# Patient Record
Sex: FEMALE | Race: WHITE | NOT HISPANIC OR LATINO | URBAN - METROPOLITAN AREA
[De-identification: names, ages, dates, MRNs, and addresses within clinical notes are randomized per-mention and may not be internally consistent; named-entity substitution may affect disease eponyms.]

---

## 2018-04-25 RX ORDER — SERTRALINE HYDROCHLORIDE 100 MG/1
TABLET, FILM COATED ORAL
Qty: 30 TABLET | Refills: 4 | Status: SHIPPED | OUTPATIENT
Start: 2018-04-25 | End: 2020-05-04

## 2018-05-24 ENCOUNTER — OFFICE VISIT (OUTPATIENT)
Dept: FAMILY MEDICINE | Facility: CLINIC | Age: 33
End: 2018-05-24
Payer: COMMERCIAL

## 2018-05-24 VITALS
HEART RATE: 68 BPM | TEMPERATURE: 99 F | SYSTOLIC BLOOD PRESSURE: 114 MMHG | BODY MASS INDEX: 28.12 KG/M2 | DIASTOLIC BLOOD PRESSURE: 76 MMHG | OXYGEN SATURATION: 97 % | HEIGHT: 62 IN | WEIGHT: 152.8 LBS

## 2018-05-24 DIAGNOSIS — H61.23 BILATERAL IMPACTED CERUMEN: Primary | ICD-10-CM

## 2018-05-24 PROCEDURE — 69210 REMOVE IMPACTED EAR WAX UNI: CPT | Performed by: FAMILY MEDICINE

## 2018-05-24 PROCEDURE — 99213 OFFICE O/P EST LOW 20 MIN: CPT | Mod: 25 | Performed by: FAMILY MEDICINE

## 2018-05-24 RX ORDER — MONTELUKAST SODIUM 10 MG/1
10 TABLET ORAL EVERY EVENING
COMMUNITY
Start: 2017-12-22 | End: 2018-05-25 | Stop reason: SDUPTHER

## 2018-05-24 RX ORDER — LANOLIN ALCOHOL/MO/W.PET/CERES
CREAM (GRAM) TOPICAL
COMMUNITY
Start: 2015-11-23

## 2018-05-24 RX ORDER — BUDESONIDE AND FORMOTEROL FUMARATE DIHYDRATE 160; 4.5 UG/1; UG/1
AEROSOL RESPIRATORY (INHALATION)
COMMUNITY
Start: 2018-01-14 | End: 2018-09-05 | Stop reason: SDUPTHER

## 2018-05-24 RX ORDER — FLUTICASONE PROPIONATE 50 MCG
1 SPRAY, SUSPENSION (ML) NASAL DAILY
COMMUNITY

## 2018-05-24 ASSESSMENT — ENCOUNTER SYMPTOMS
RHINORRHEA: 0
FEVER: 0
CHILLS: 0

## 2018-05-24 NOTE — PATIENT INSTRUCTIONS
Limit Qtip use  Hydrogen Peroxide drops for a few days prior to future appointments  Let me know if any ear pain  Schedule physical

## 2018-05-24 NOTE — PROGRESS NOTES
[facility]  [address] [place] [address]  [phone]       Reason for visit:   Chief Complaint   Patient presents with   • Cerumen Impaction     bilateral   • other     wants to discuss about getting blood work      HPI   Analilia Gunderson is a 33 y.o. female who presents with ear issue   - Ear Issue  Recurrent issue  Had them cleaned out recently - few months ago  Feels clogged again  Both ears, R > L  Not painful, discharge  Denies fevers, chills, congestion     History reviewed. No pertinent past medical history.  History reviewed. No pertinent surgical history.  Social History     Social History   • Marital status: Single     Spouse name: N/A   • Number of children: N/A   • Years of education: N/A     Occupational History   • Not on file.     Social History Main Topics   • Smoking status: Former Smoker   • Smokeless tobacco: Never Used   • Alcohol use Yes      Comment: social   • Drug use: No   • Sexual activity: Not on file     Other Topics Concern   • Not on file     Social History Narrative    Do you wear your seatbelt? Yes    Do you have smoke detector in your home? Yes    Do you have a carbon monoxide detector in your home? Yes    Current Occupation?     Current Marital Status? Single         History reviewed. No pertinent family history.  No known allergies  Current Outpatient Prescriptions   Medication Sig Dispense Refill   • budesonide-formoterol (SYMBICORT) 160-4.5 mcg/actuation inhaler INHALE 2 PUFF BY INHALATION ROUTE 2 TIMES EVERY DAY IN THE MORNING AND EVENING     • cetirizine (ZyrTEC) 10 mg tablet 10 mg.     • cyanocobalamin (vitamin B-12) 1,000 mcg tablet Take by mouth.     • fluticasone (FLONASE) 50 mcg/actuation nasal spray Administer 1 spray into each nostril daily.     • montelukast (SINGULAIR) 10 mg tablet Take 10 mg by mouth every evening.     • sertraline (ZOLOFT) 100 mg tablet TAKE 1 TABLET BY MOUTH EVERY DAY 30 tablet 4      No current facility-administered medications for this visit.        Review of Systems   Constitutional: Negative for chills and fever.   HENT: Positive for ear pain and hearing loss. Negative for congestion, ear discharge and rhinorrhea.      Objective   Vitals:    05/24/18 1349   BP: 114/76   Pulse: 68   Temp: 37.2 °C (99 °F)   SpO2: 97%       Physical Exam   HENT:   Right Ear: External ear normal.   Left Ear: External ear normal.   B/l cerumen impaction       Ear cerumen removal  Date/Time: 5/24/2018 2:15 PM  Performed by: KIM CEDEÑO  Authorized by: KIM CEDEÑO     Consent:     Consent obtained:  Verbal    Consent given by:  Patient    Risks discussed:  Pain, TM perforation, incomplete removal and dizziness    Alternatives discussed:  Observation and referral  Procedure details:     Location:  L ear and R ear    Procedure type: irrigation      Procedure type comment:  With curette  Post-procedure details:     Inspection:  TM intact    Hearing quality:  Improved    Patient tolerance of procedure:  Tolerated well, no immediate complications        Lab Results   Component Value Date    WBC 13.46 (H) 10/08/2015    HGB 10.9 (L) 10/08/2015    HCT 31.8 (L) 10/08/2015     10/08/2015         Assessment   Problem List Items Addressed This Visit     None      Visit Diagnoses     Bilateral impacted cerumen    -  Primary    New Problem  Chronic  Happens 2x/year  Trouble hearing  No pain  Ears cleaned with improvement and no SEs              Kim Cedeño MD  5/24/2018

## 2018-05-25 RX ORDER — MONTELUKAST SODIUM 10 MG/1
TABLET ORAL
Qty: 30 TABLET | Refills: 4 | Status: SHIPPED | OUTPATIENT
Start: 2018-05-25 | End: 2018-10-22 | Stop reason: SDUPTHER

## 2018-06-07 ENCOUNTER — OFFICE VISIT (OUTPATIENT)
Dept: FAMILY MEDICINE | Facility: CLINIC | Age: 33
End: 2018-06-07
Payer: COMMERCIAL

## 2018-06-07 VITALS
BODY MASS INDEX: 26.87 KG/M2 | OXYGEN SATURATION: 98 % | TEMPERATURE: 98.2 F | WEIGHT: 146 LBS | HEIGHT: 62 IN | HEART RATE: 71 BPM | DIASTOLIC BLOOD PRESSURE: 78 MMHG | SYSTOLIC BLOOD PRESSURE: 114 MMHG

## 2018-06-07 DIAGNOSIS — Z00.00 PREVENTATIVE HEALTH CARE: Primary | ICD-10-CM

## 2018-06-07 DIAGNOSIS — F32.1 MODERATE MAJOR DEPRESSION (CMS/HCC): ICD-10-CM

## 2018-06-07 DIAGNOSIS — J45.40 MODERATE PERSISTENT ASTHMA WITHOUT COMPLICATION: ICD-10-CM

## 2018-06-07 PROCEDURE — 99395 PREV VISIT EST AGE 18-39: CPT | Performed by: FAMILY MEDICINE

## 2018-06-07 ASSESSMENT — ENCOUNTER SYMPTOMS
POLYPHAGIA: 0
BRUISES/BLEEDS EASILY: 0
WHEEZING: 0
POLYDIPSIA: 0
HEADACHES: 0
SHORTNESS OF BREATH: 0
FATIGUE: 1
DIARRHEA: 0
ARTHRALGIAS: 0
CONSTIPATION: 0
BACK PAIN: 0

## 2018-06-07 NOTE — PROGRESS NOTES
[place] [address]  [phone]       Reason for visit:   Chief Complaint   Patient presents with   • Annual Exam      HPI   Analilia Gunderson is a 33 y.o. female who presents for CPE. She has depression and asthma. She feels Zoloft is not effective and may be cotributing to weight gain. She also has family history of thyroid disease      History reviewed. No pertinent past medical history.  History reviewed. No pertinent surgical history.  Social History     Social History   • Marital status: Single     Spouse name: N/A   • Number of children: N/A   • Years of education: N/A     Occupational History   • Not on file.     Social History Main Topics   • Smoking status: Former Smoker   • Smokeless tobacco: Never Used   • Alcohol use Yes      Comment: social   • Drug use: No   • Sexual activity: Not on file     Other Topics Concern   • Not on file     Social History Narrative    Do you wear your seatbelt? Yes    Do you have smoke detector in your home? Yes    Do you have a carbon monoxide detector in your home? Yes    Current Occupation?     Current Marital Status? Single         No family history on file.  No known allergies  Current Outpatient Prescriptions   Medication Sig Dispense Refill   • budesonide-formoterol (SYMBICORT) 160-4.5 mcg/actuation inhaler INHALE 2 PUFF BY INHALATION ROUTE 2 TIMES EVERY DAY IN THE MORNING AND EVENING     • cetirizine (ZyrTEC) 10 mg tablet 10 mg.     • cyanocobalamin (vitamin B-12) 1,000 mcg tablet Take by mouth.     • fluticasone (FLONASE) 50 mcg/actuation nasal spray Administer 1 spray into each nostril daily.     • montelukast (SINGULAIR) 10 mg tablet TAKE 1 TABLET (10MG) BY ORAL ROUTE EVERY DAY IN THE EVENING 30 tablet 4   • sertraline (ZOLOFT) 100 mg tablet TAKE 1 TABLET BY MOUTH EVERY DAY 30 tablet 4     No current facility-administered medications for this visit.        Review of Systems  "  Constitutional: Positive for fatigue.   HENT: Negative for hearing loss.    Eyes: Negative for visual disturbance.   Respiratory: Negative for shortness of breath and wheezing.    Cardiovascular: Negative for chest pain and leg swelling.   Gastrointestinal: Negative for constipation and diarrhea.   Endocrine: Negative for polydipsia, polyphagia and polyuria.   Musculoskeletal: Negative for arthralgias and back pain.   Skin: Negative for rash.   Neurological: Negative for headaches.   Hematological: Does not bruise/bleed easily.     Objective   Vitals:    06/07/18 1205   BP: 114/78   BP Location: Left upper arm   Patient Position: Sitting   Pulse: 71   Temp: 36.8 °C (98.2 °F)   TempSrc: Oral   SpO2: 98%   Weight: 66.2 kg (146 lb)   Height: 1.575 m (5' 2\")     Body mass index is 26.7 kg/m².  Physical Exam   Constitutional: She appears well-nourished.   HENT:   Right Ear: Tympanic membrane normal.   Left Ear: Tympanic membrane normal.   Mouth/Throat: Oropharynx is clear and moist.   Eyes: Conjunctivae are normal.   Neck: No thyromegaly present.   Cardiovascular: Normal rate, regular rhythm, normal heart sounds and intact distal pulses.    Pulmonary/Chest: Effort normal and breath sounds normal.   Abdominal: Soft.   Musculoskeletal: Normal range of motion.   Neurological: She is alert.   Skin: Skin is warm and dry.   Psychiatric: She has a normal mood and affect. Her behavior is normal. Thought content normal.       Procedures    Lab Results   Component Value Date    WBC 13.46 (H) 10/08/2015    HGB 10.9 (L) 10/08/2015    HCT 31.8 (L) 10/08/2015     10/08/2015           Assessment   Problem List Items Addressed This Visit     Moderate persistent asthma without complication     Stable  Continue meds  Well controlled         Moderate major depression (CMS/HCC) (HCC)     To consider switch to Lexapro if labs are normal         Preventative health care - Primary     Labs  She will get old immunization record         " Relevant Orders    CBC and Differential    Comprehensive metabolic panel    Lipid panel    TSH 3rd Generation              Harry A. Frankel, MD  6/7/2018

## 2018-06-16 LAB
ALBUMIN SERPL-MCNC: 3.8 G/DL (ref 3.5–5.5)
ALBUMIN/GLOB SERPL: 1.4 {RATIO} (ref 1.2–2.2)
ALP SERPL-CCNC: 51 IU/L (ref 39–117)
ALT SERPL-CCNC: 14 IU/L (ref 0–32)
AST SERPL-CCNC: 19 IU/L (ref 0–40)
BASOPHILS # BLD AUTO: 0.1 X10E3/UL (ref 0–0.2)
BASOPHILS NFR BLD AUTO: 1 %
BILIRUB SERPL-MCNC: <0.2 MG/DL (ref 0–1.2)
BUN SERPL-MCNC: 11 MG/DL (ref 6–20)
BUN/CREAT SERPL: 16 (ref 9–23)
CALCIUM SERPL-MCNC: 9.2 MG/DL (ref 8.7–10.2)
CHLORIDE SERPL-SCNC: 103 MMOL/L (ref 96–106)
CHOLEST SERPL-MCNC: 164 MG/DL (ref 100–199)
CO2 SERPL-SCNC: 22 MMOL/L (ref 20–29)
CREAT SERPL-MCNC: 0.69 MG/DL (ref 0.57–1)
EOSINOPHIL # BLD AUTO: 0.2 X10E3/UL (ref 0–0.4)
EOSINOPHIL NFR BLD AUTO: 4 %
ERYTHROCYTE [DISTWIDTH] IN BLOOD BY AUTOMATED COUNT: 15.1 % (ref 12.3–15.4)
GFR SERPLBLD CREATININE-BSD FMLA CKD-EPI: 115 ML/MIN/1.73
GFR SERPLBLD CREATININE-BSD FMLA CKD-EPI: 132 ML/MIN/1.73
GLOBULIN SER CALC-MCNC: 2.8 G/DL (ref 1.5–4.5)
GLUCOSE SERPL-MCNC: 95 MG/DL (ref 65–99)
HCT VFR BLD AUTO: 34.7 % (ref 34–46.6)
HDLC SERPL-MCNC: 60 MG/DL
HGB BLD-MCNC: 11.1 G/DL (ref 11.1–15.9)
IMM GRANULOCYTES # BLD: 0 X10E3/UL (ref 0–0.1)
IMM GRANULOCYTES NFR BLD: 0 %
LDLC SERPL CALC-MCNC: 87 MG/DL (ref 0–99)
LYMPHOCYTES # BLD AUTO: 1.6 X10E3/UL (ref 0.7–3.1)
LYMPHOCYTES NFR BLD AUTO: 31 %
MCH RBC QN AUTO: 27 PG (ref 26.6–33)
MCHC RBC AUTO-ENTMCNC: 32 G/DL (ref 31.5–35.7)
MCV RBC AUTO: 84 FL (ref 79–97)
MONOCYTES # BLD AUTO: 0.5 X10E3/UL (ref 0.1–0.9)
MONOCYTES NFR BLD AUTO: 11 %
NEUTROPHILS # BLD AUTO: 2.7 X10E3/UL (ref 1.4–7)
NEUTROPHILS NFR BLD AUTO: 53 %
PLATELET # BLD AUTO: 300 X10E3/UL (ref 150–379)
POTASSIUM SERPL-SCNC: 4.4 MMOL/L (ref 3.5–5.2)
PROT SERPL-MCNC: 6.6 G/DL (ref 6–8.5)
RBC # BLD AUTO: 4.11 X10E6/UL (ref 3.77–5.28)
SODIUM SERPL-SCNC: 140 MMOL/L (ref 134–144)
TRIGL SERPL-MCNC: 83 MG/DL (ref 0–149)
TSH SERPL DL<=0.005 MIU/L-ACNC: 1.39 UIU/ML (ref 0.45–4.5)
VLDLC SERPL CALC-MCNC: 17 MG/DL (ref 5–40)
WBC # BLD AUTO: 5 X10E3/UL (ref 3.4–10.8)

## 2018-06-18 ENCOUNTER — TELEPHONE (OUTPATIENT)
Dept: FAMILY MEDICINE | Facility: CLINIC | Age: 33
End: 2018-06-18

## 2018-06-18 NOTE — TELEPHONE ENCOUNTER
I called pt- LMOM.    ----- Message from Harry A Frankel, MD sent at 6/17/2018 12:34 PM EDT -----  Please call and notify that her labs all came back normal. No change in meds

## 2018-09-05 RX ORDER — BUDESONIDE AND FORMOTEROL FUMARATE DIHYDRATE 160; 4.5 UG/1; UG/1
AEROSOL RESPIRATORY (INHALATION)
Qty: 10.2 INHALER | Refills: 3 | Status: SHIPPED | OUTPATIENT
Start: 2018-09-05 | End: 2019-01-24 | Stop reason: SDUPTHER

## 2018-10-22 RX ORDER — MONTELUKAST SODIUM 10 MG/1
TABLET ORAL
Qty: 30 TABLET | Refills: 4 | Status: SHIPPED | OUTPATIENT
Start: 2018-10-22 | End: 2019-01-24 | Stop reason: SDUPTHER

## 2018-11-13 ENCOUNTER — DOCUMENTATION (OUTPATIENT)
Dept: FAMILY MEDICINE | Facility: CLINIC | Age: 33
End: 2018-11-13

## 2019-01-24 RX ORDER — MONTELUKAST SODIUM 10 MG/1
10 TABLET ORAL EVERY EVENING
Qty: 90 TABLET | Refills: 3 | Status: SHIPPED | OUTPATIENT
Start: 2019-01-24 | End: 2019-07-01 | Stop reason: SDUPTHER

## 2019-01-24 RX ORDER — BUDESONIDE AND FORMOTEROL FUMARATE DIHYDRATE 160; 4.5 UG/1; UG/1
2 AEROSOL RESPIRATORY (INHALATION) 2 TIMES DAILY
Qty: 10.2 INHALER | Refills: 3 | Status: SHIPPED | OUTPATIENT
Start: 2019-01-24 | End: 2019-01-28 | Stop reason: SDUPTHER

## 2019-01-28 RX ORDER — BUDESONIDE AND FORMOTEROL FUMARATE DIHYDRATE 160; 4.5 UG/1; UG/1
2 AEROSOL RESPIRATORY (INHALATION) 2 TIMES DAILY
Qty: 3 INHALER | Refills: 3 | Status: SHIPPED | OUTPATIENT
Start: 2019-01-28 | End: 2019-01-29 | Stop reason: SDUPTHER

## 2019-01-29 RX ORDER — BUDESONIDE AND FORMOTEROL FUMARATE DIHYDRATE 160; 4.5 UG/1; UG/1
2 AEROSOL RESPIRATORY (INHALATION) 2 TIMES DAILY
Qty: 3 INHALER | Refills: 3 | Status: SHIPPED | OUTPATIENT
Start: 2019-01-29 | End: 2020-02-14

## 2019-05-28 ENCOUNTER — OFFICE VISIT (OUTPATIENT)
Dept: FAMILY MEDICINE | Facility: CLINIC | Age: 34
End: 2019-05-28
Payer: COMMERCIAL

## 2019-05-28 VITALS
BODY MASS INDEX: 26.31 KG/M2 | DIASTOLIC BLOOD PRESSURE: 74 MMHG | WEIGHT: 143 LBS | HEART RATE: 66 BPM | OXYGEN SATURATION: 98 % | TEMPERATURE: 98.4 F | SYSTOLIC BLOOD PRESSURE: 114 MMHG | HEIGHT: 62 IN

## 2019-05-28 DIAGNOSIS — H61.23 BILATERAL HEARING LOSS DUE TO CERUMEN IMPACTION: Primary | ICD-10-CM

## 2019-05-28 PROBLEM — R53.81 MALAISE AND FATIGUE: Status: ACTIVE | Noted: 2018-04-04

## 2019-05-28 PROBLEM — R06.83 SNORING: Status: ACTIVE | Noted: 2018-04-04

## 2019-05-28 PROBLEM — R53.83 MALAISE AND FATIGUE: Status: ACTIVE | Noted: 2018-04-04

## 2019-05-28 PROCEDURE — 69210 REMOVE IMPACTED EAR WAX UNI: CPT | Performed by: FAMILY MEDICINE

## 2019-05-28 PROCEDURE — 99213 OFFICE O/P EST LOW 20 MIN: CPT | Mod: 25 | Performed by: FAMILY MEDICINE

## 2019-05-28 RX ORDER — CITALOPRAM 20 MG/1
20 TABLET, FILM COATED ORAL DAILY
COMMUNITY
End: 2020-05-04 | Stop reason: SDUPTHER

## 2019-05-28 ASSESSMENT — ENCOUNTER SYMPTOMS
COUGH: 0
SORE THROAT: 0
RHINORRHEA: 0
DIARRHEA: 0
FEVER: 0
SHORTNESS OF BREATH: 0
CHILLS: 0
NAUSEA: 0

## 2019-05-28 NOTE — PATIENT INSTRUCTIONS
Symptoms should continue to improve the rest of the day  Can try Debrox drops or Hydrogen Peroxide  Happy to clear the ears out again when/if symptoms return

## 2019-05-28 NOTE — PROGRESS NOTES
[place] [address]  [phone]       Reason for visit:   Chief Complaint   Patient presents with   • Earache / Otalgia      HPI   Analilia Phillips is a 34 y.o. female who presents with ear pain   - Ear Pain  Hx of ear issues  Needs to have them cleared out a few times a year  X 2 weeks  R > L  Feels full, clogged  Decreased hearing  Feels sore  Denies discharge  Denies fevers, chills  Has not put anything in her ears     History reviewed. No pertinent past medical history.  History reviewed. No pertinent surgical history.  Social History     Social History   • Marital status: Single     Spouse name: N/A   • Number of children: N/A   • Years of education: N/A     Occupational History   • Not on file.     Social History Main Topics   • Smoking status: Former Smoker   • Smokeless tobacco: Never Used   • Alcohol use Yes      Comment: social   • Drug use: No   • Sexual activity: Not on file     Other Topics Concern   • Not on file     Social History Narrative    Do you wear your seatbelt? Yes    Do you have smoke detector in your home? Yes    Do you have a carbon monoxide detector in your home? Yes    Current Occupation?     Current Marital Status? Single         History reviewed. No pertinent family history.  Penicillins  Current Outpatient Prescriptions   Medication Sig Dispense Refill   • budesonide-formoterol (SYMBICORT) 160-4.5 mcg/actuation inhaler Inhale 2 puffs 2 (two) times a day. Rinse mouth with water after use to reduce aftertaste and incidence of candidiasis. Do not swallow. 3 Inhaler 3   • cetirizine (ZyrTEC) 10 mg tablet 10 mg.     • citalopram (celeXA) 20 mg tablet Take 20 mg by mouth daily.     • cyanocobalamin (vitamin B-12) 1,000 mcg tablet Take by mouth.     • fluticasone (FLONASE) 50 mcg/actuation nasal spray Administer 1 spray into each nostril daily.     • montelukast (SINGULAIR) 10 mg tablet Take 1  "tablet (10 mg total) by mouth every evening. 90 tablet 3   • sertraline (ZOLOFT) 100 mg tablet TAKE 1 TABLET BY MOUTH EVERY DAY (Patient not taking: Reported on 5/28/2019) 30 tablet 4     No current facility-administered medications for this visit.        Review of Systems   Constitutional: Negative for chills and fever.   HENT: Positive for ear pain, hearing loss and postnasal drip. Negative for congestion, ear discharge, rhinorrhea and sore throat.    Respiratory: Negative for cough and shortness of breath.    Cardiovascular: Negative for chest pain.   Gastrointestinal: Negative for diarrhea and nausea.     Objective   Vitals:    05/28/19 1330   BP: 114/74   BP Location: Left upper arm   Patient Position: Sitting   Pulse: 66   Temp: 36.9 °C (98.4 °F)   TempSrc: Oral   SpO2: 98%   Weight: 64.9 kg (143 lb)   Height: 1.575 m (5' 2\")       Physical Exam   HENT:   Right Ear: There is tenderness. A foreign body is present. Tympanic membrane is not perforated, not erythematous, not retracted and not bulging.   Left Ear: There is tenderness. A foreign body is present. Tympanic membrane is not perforated, not erythematous, not retracted and not bulging.       Ear cerumen removal  Date/Time: 5/28/2019 1:47 PM  Performed by: KIM CEDEÑO  Authorized by: KIM CEDEÑO     Consent:     Consent obtained:  Verbal    Consent given by:  Patient    Risks discussed:  Pain, TM perforation, incomplete removal and dizziness    Alternatives discussed:  Observation and referral  Procedure details:     Location:  L ear and R ear    Procedure type: irrigation      Procedure type comment:  With curette  Post-procedure details:     Inspection:  TM intact    Hearing quality:  Improved    Patient tolerance of procedure:  Tolerated well, no immediate complications        Lab Results   Component Value Date    WBC 5.0 06/15/2018    HGB 11.1 06/15/2018    HCT 34.7 06/15/2018     06/15/2018    CHOL 164 06/15/2018    TRIG 83 06/15/2018 "    HDL 60 06/15/2018    ALT 14 06/15/2018    AST 19 06/15/2018     06/15/2018    K 4.4 06/15/2018     06/15/2018    CREATININE 0.69 06/15/2018    BUN 11 06/15/2018    CO2 22 06/15/2018    TSH 1.390 06/15/2018         Assessment   Problem List Items Addressed This Visit     None      Visit Diagnoses     Bilateral hearing loss due to cerumen impaction    -  Primary    New Problem  x 2 weeks  R > L  Trouble hearing  Ache  No discharge  Denies F/C  B/L cerumen impaction  Removed with lavage and currete  TM visualized and WNL      Debrox or Hydrogen Peroxide as needed        Srinivasa Neumann MD  5/28/2019

## 2019-07-01 RX ORDER — MONTELUKAST SODIUM 10 MG/1
TABLET ORAL
Qty: 30 TABLET | Refills: 1 | Status: SHIPPED | OUTPATIENT
Start: 2019-07-01 | End: 2020-03-13

## 2019-07-01 NOTE — TELEPHONE ENCOUNTER
Medicine Refill Request    Last Office Visit: 5/28/2019  Next Office Visit: Visit date not found        Current Outpatient Prescriptions:   •  budesonide-formoterol (SYMBICORT) 160-4.5 mcg/actuation inhaler, Inhale 2 puffs 2 (two) times a day. Rinse mouth with water after use to reduce aftertaste and incidence of candidiasis. Do not swallow., Disp: 3 Inhaler, Rfl: 3  •  cetirizine (ZyrTEC) 10 mg tablet, 10 mg., Disp: , Rfl:   •  citalopram (celeXA) 20 mg tablet, Take 20 mg by mouth daily., Disp: , Rfl:   •  cyanocobalamin (vitamin B-12) 1,000 mcg tablet, Take by mouth., Disp: , Rfl:   •  fluticasone (FLONASE) 50 mcg/actuation nasal spray, Administer 1 spray into each nostril daily., Disp: , Rfl:   •  montelukast (SINGULAIR) 10 mg tablet, Take 1 tablet (10 mg total) by mouth every evening., Disp: 90 tablet, Rfl: 3  •  sertraline (ZOLOFT) 100 mg tablet, TAKE 1 TABLET BY MOUTH EVERY DAY (Patient not taking: Reported on 5/28/2019), Disp: 30 tablet, Rfl: 4      BP Readings from Last 3 Encounters:   05/28/19 114/74   06/07/18 114/78   05/24/18 114/76       Recent Lab results:  Lab Results   Component Value Date    CHOL 164 06/15/2018   ,   Lab Results   Component Value Date    HDL 60 06/15/2018   ,   Lab Results   Component Value Date    LDLCALC 87 06/15/2018   ,   Lab Results   Component Value Date    TRIG 83 06/15/2018        Lab Results   Component Value Date    GLUCOSE 95 06/15/2018   , No results found for: HGBA1C      Lab Results   Component Value Date    CREATININE 0.69 06/15/2018       Lab Results   Component Value Date    TSH 1.390 06/15/2018

## 2019-09-03 ENCOUNTER — TELEPHONE (OUTPATIENT)
Dept: PRIMARY CARE | Facility: CLINIC | Age: 34
End: 2019-09-03

## 2019-09-03 NOTE — PROGRESS NOTES
Call over weekend- 2 days of sore throat and fever w sore glands and no other symptoms- concern for strep. Allergic to PCN but ok with Cephalosporins so called Ceftin 500 BID to her pharmacy.

## 2020-02-14 RX ORDER — BUDESONIDE AND FORMOTEROL FUMARATE DIHYDRATE 160; 4.5 UG/1; UG/1
AEROSOL RESPIRATORY (INHALATION)
Qty: 30.6 G | Refills: 4 | Status: SHIPPED | OUTPATIENT
Start: 2020-02-14 | End: 2020-09-04

## 2020-03-13 RX ORDER — MONTELUKAST SODIUM 10 MG/1
TABLET ORAL
Qty: 90 TABLET | Refills: 3 | Status: SHIPPED | OUTPATIENT
Start: 2020-03-13 | End: 2021-03-08

## 2020-05-04 ENCOUNTER — TELEMEDICINE (OUTPATIENT)
Dept: FAMILY MEDICINE | Facility: CLINIC | Age: 35
End: 2020-05-04
Payer: COMMERCIAL

## 2020-05-04 DIAGNOSIS — J45.40 MODERATE PERSISTENT ASTHMA WITHOUT COMPLICATION: Primary | ICD-10-CM

## 2020-05-04 DIAGNOSIS — F32.1 MODERATE MAJOR DEPRESSION (CMS/HCC): ICD-10-CM

## 2020-05-04 DIAGNOSIS — J30.1 SEASONAL ALLERGIC RHINITIS DUE TO POLLEN: ICD-10-CM

## 2020-05-04 PROCEDURE — 99214 OFFICE O/P EST MOD 30 MIN: CPT | Mod: 95 | Performed by: FAMILY MEDICINE

## 2020-05-04 RX ORDER — ALBUTEROL SULFATE 5 MG/ML
2.5 SOLUTION RESPIRATORY (INHALATION) EVERY 6 HOURS PRN
COMMUNITY
End: 2020-05-04

## 2020-05-04 RX ORDER — CITALOPRAM 20 MG/1
20 TABLET, FILM COATED ORAL DAILY
Qty: 30 TABLET | Refills: 5 | Status: SHIPPED | OUTPATIENT
Start: 2020-05-04 | End: 2020-09-08 | Stop reason: SDUPTHER

## 2020-05-04 RX ORDER — ALBUTEROL SULFATE 0.83 MG/ML
2.5 SOLUTION RESPIRATORY (INHALATION) EVERY 6 HOURS PRN
Qty: 1 PACKAGE | Refills: 1 | Status: SHIPPED | OUTPATIENT
Start: 2020-05-04 | End: 2020-10-28

## 2020-05-04 RX ORDER — PREDNISONE 20 MG/1
40 TABLET ORAL DAILY
Qty: 10 TABLET | Refills: 0 | Status: SHIPPED | OUTPATIENT
Start: 2020-05-04 | End: 2020-07-27

## 2020-05-04 ASSESSMENT — ENCOUNTER SYMPTOMS
SHORTNESS OF BREATH: 1
FATIGUE: 0
WHEEZING: 1
SLEEP DISTURBANCE: 0
NERVOUS/ANXIOUS: 0

## 2020-05-04 NOTE — PROGRESS NOTES
Verification of Patient Location:  The patient affirms they are currently located in the following state: [place]    Request for Consent:   Audio Only Encounter   You and I are about to have a telemedicine check-in or visit. This is allowed because you have requested it. This telemedicine visit will be billed to your health insurance or you, if you are self-insured. You understand you will be responsible for any copayments or coinsurances that apply to your telemedicine visit. Before starting our telemedicine visit, I am required to get your consent for this virtual check-in or visit by telemedicine. Do you consent?    Patient Response to Request for Consent:  Yes      Visit Documentation:  Subjective     Patient ID: Analilia Gunderson is a 35 y.o. female.  1985      34 yo with asthma, depression and allergic rhinitis. Her asthma has flared and she has not been controlled on her medications. This is a seasonal problems. She has depression and has been using Celexa under her mother's name. Her symptoms are controlled on meds. Her asthma and allergies are not well controlled. She has not been getting flu shots. She needs to schedule gynecology.      The following have been reviewed and updated as appropriate in this visit:  Tobacco  Allergies  Meds  Problems  Med Hx  Surg Hx  Fam Hx  Soc Hx        Review of Systems   Constitutional: Negative for fatigue.   Respiratory: Positive for shortness of breath and wheezing.    Cardiovascular: Negative for chest pain.   Psychiatric/Behavioral: Negative for sleep disturbance. The patient is not nervous/anxious.          Assessment/Plan   Diagnoses and all orders for this visit:    Moderate persistent asthma without complication (Primary)  Assessment & Plan:  Not controlled  Add prednisone  Meds renewed    Orders:  -     Lipid panel; Future    Moderate major depression (CMS/[facility])  Assessment & Plan:  Stable  Continue meds  Labs    Orders:  -     CBC and  differential; Future  -     Comprehensive metabolic panel; Future  -     Lipid panel; Future  -     TSH; Future    Seasonal allergic rhinitis due to pollen  Assessment & Plan:  OTC meds      Other orders  -     citalopram (celeXA) 20 mg tablet; Take 1 tablet (20 mg total) by mouth daily.  -     albuterol sulfate 90 mcg/actuation inhaler; Inhale 2 puffs every 6 (six) hours as needed for wheezing.  -     albuterol 2.5 mg /3 mL (0.083 %) nebulizer solution; Take 3 mL (2.5 mg total) by nebulization every 6 (six) hours as needed for wheezing.  -     predniSONE (DELTASONE) 20 mg tablet; Take 2 tablets (40 mg total) by mouth daily for 5 days.      Time Spent in Medical Discussion During This Encounter:      24 minutes

## 2020-07-18 LAB
ALBUMIN SERPL-MCNC: 4.2 G/DL (ref 3.8–4.8)
ALBUMIN/GLOB SERPL: 1.6 {RATIO} (ref 1.2–2.2)
ALP SERPL-CCNC: 43 IU/L (ref 39–117)
ALT SERPL-CCNC: 10 IU/L (ref 0–32)
AST SERPL-CCNC: 13 IU/L (ref 0–40)
BASOPHILS # BLD AUTO: 0.1 X10E3/UL (ref 0–0.2)
BASOPHILS NFR BLD AUTO: 1 %
BILIRUB SERPL-MCNC: 0.2 MG/DL (ref 0–1.2)
BUN SERPL-MCNC: 14 MG/DL (ref 6–20)
BUN/CREAT SERPL: 18 (ref 9–23)
CALCIUM SERPL-MCNC: 9.4 MG/DL (ref 8.7–10.2)
CHLORIDE SERPL-SCNC: 106 MMOL/L (ref 96–106)
CHOLEST SERPL-MCNC: 194 MG/DL (ref 100–199)
CO2 SERPL-SCNC: 21 MMOL/L (ref 20–29)
CREAT SERPL-MCNC: 0.78 MG/DL (ref 0.57–1)
EOSINOPHIL # BLD AUTO: 0.1 X10E3/UL (ref 0–0.4)
EOSINOPHIL NFR BLD AUTO: 3 %
ERYTHROCYTE [DISTWIDTH] IN BLOOD BY AUTOMATED COUNT: 13 % (ref 11.7–15.4)
GLOBULIN SER CALC-MCNC: 2.6 G/DL (ref 1.5–4.5)
GLUCOSE SERPL-MCNC: 95 MG/DL (ref 65–99)
HCT VFR BLD AUTO: 36.6 % (ref 34–46.6)
HDLC SERPL-MCNC: 97 MG/DL
HGB BLD-MCNC: 12.1 G/DL (ref 11.1–15.9)
IMM GRANULOCYTES # BLD AUTO: 0 X10E3/UL (ref 0–0.1)
IMM GRANULOCYTES NFR BLD AUTO: 0 %
LAB CORP EGFR IF AFRICN AM: 114 ML/MIN/1.73
LAB CORP EGFR IF NONAFRICN AM: 99 ML/MIN/1.73
LDLC SERPL CALC-MCNC: 79 MG/DL (ref 0–99)
LYMPHOCYTES # BLD AUTO: 1.5 X10E3/UL (ref 0.7–3.1)
LYMPHOCYTES NFR BLD AUTO: 34 %
MCH RBC QN AUTO: 28.6 PG (ref 26.6–33)
MCHC RBC AUTO-ENTMCNC: 33.1 G/DL (ref 31.5–35.7)
MCV RBC AUTO: 87 FL (ref 79–97)
MONOCYTES # BLD AUTO: 0.5 X10E3/UL (ref 0.1–0.9)
MONOCYTES NFR BLD AUTO: 11 %
NEUTROPHILS # BLD AUTO: 2.3 X10E3/UL (ref 1.4–7)
NEUTROPHILS NFR BLD AUTO: 51 %
PLATELET # BLD AUTO: 257 X10E3/UL (ref 150–450)
POTASSIUM SERPL-SCNC: 4.1 MMOL/L (ref 3.5–5.2)
PROT SERPL-MCNC: 6.8 G/DL (ref 6–8.5)
RBC # BLD AUTO: 4.23 X10E6/UL (ref 3.77–5.28)
SODIUM SERPL-SCNC: 140 MMOL/L (ref 134–144)
TRIGL SERPL-MCNC: 89 MG/DL (ref 0–149)
TSH SERPL DL<=0.005 MIU/L-ACNC: 1.44 UIU/ML (ref 0.45–4.5)
VLDLC SERPL CALC-MCNC: 18 MG/DL (ref 5–40)
WBC # BLD AUTO: 4.5 X10E3/UL (ref 3.4–10.8)

## 2020-07-27 ENCOUNTER — OFFICE VISIT (OUTPATIENT)
Dept: FAMILY MEDICINE | Facility: CLINIC | Age: 35
End: 2020-07-27
Payer: COMMERCIAL

## 2020-07-27 VITALS
OXYGEN SATURATION: 99 % | HEIGHT: 62 IN | DIASTOLIC BLOOD PRESSURE: 68 MMHG | SYSTOLIC BLOOD PRESSURE: 98 MMHG | HEART RATE: 77 BPM | BODY MASS INDEX: 25.14 KG/M2 | WEIGHT: 136.6 LBS | TEMPERATURE: 97.7 F

## 2020-07-27 DIAGNOSIS — F32.1 MODERATE MAJOR DEPRESSION (CMS/HCC): ICD-10-CM

## 2020-07-27 DIAGNOSIS — J45.40 MODERATE PERSISTENT ASTHMA WITHOUT COMPLICATION: Primary | ICD-10-CM

## 2020-07-27 PROCEDURE — 99214 OFFICE O/P EST MOD 30 MIN: CPT | Performed by: FAMILY MEDICINE

## 2020-07-27 RX ORDER — PREDNISONE 50 MG/1
50 TABLET ORAL DAILY
Qty: 5 TABLET | Refills: 0 | Status: SHIPPED | OUTPATIENT
Start: 2020-07-27 | End: 2020-09-04

## 2020-07-27 ASSESSMENT — ENCOUNTER SYMPTOMS
WHEEZING: 1
SHORTNESS OF BREATH: 1
FATIGUE: 1
FEVER: 0

## 2020-07-27 NOTE — PROGRESS NOTES
[place] [address]  [phone]       Reason for visit:   Chief Complaint   Patient presents with   • Asthma     flare up      HPI   Analilia Gunderson is a 35 y.o. female who presents with asthma. Monthly flares which seem to be triggered by hormones. Using all her meds. Needed treatment with steroids in May. This episode started 7 days ago and is worsening.      History reviewed. No pertinent past medical history.  History reviewed. No pertinent surgical history.  Social History     Tobacco Use   • Smoking status: Former Smoker   • Smokeless tobacco: Never Used   Substance Use Topics   • Alcohol use: Yes     Comment: social   • Drug use: No      Family History   Problem Relation Age of Onset   • Thyroid cancer Biological Mother      Penicillins  Current Outpatient Medications   Medication Sig Dispense Refill   • albuterol 2.5 mg /3 mL (0.083 %) nebulizer solution Take 3 mL (2.5 mg total) by nebulization every 6 (six) hours as needed for wheezing. 1 Package 1   • albuterol sulfate 90 mcg/actuation inhaler Inhale 2 puffs every 6 (six) hours as needed for wheezing. 1 Package 3   • cetirizine (ZyrTEC) 10 mg tablet 10 mg.     • citalopram (celeXA) 20 mg tablet Take 1 tablet (20 mg total) by mouth daily. 30 tablet 5   • cyanocobalamin (vitamin B-12) 1,000 mcg tablet Take by mouth.     • fluticasone (FLONASE) 50 mcg/actuation nasal spray Administer 1 spray into each nostril daily.     • montelukast (SINGULAIR) 10 mg tablet TAKE 1 TABLET EVERY EVENING 90 tablet 3   • SYMBICORT 160-4.5 mcg/actuation inhaler USE 2 INHALATIONS TWICE A DAY. RINSE MOUTH WITH WATER AFTER USE TO REDUCE AFTERTASTE AND INCIDENCE OF CANDIDIASIS. DO NOT SWALLOW 30.6 g 4   • predniSONE (DELTASONE) 50 mg tablet Take 1 tablet (50 mg total) by mouth daily for 5 days. 5 tablet 0     No current facility-administered medications for this visit.        Patient Active Problem List     "Diagnosis Date Noted   • Seasonal allergic rhinitis due to pollen 05/04/2020   • Moderate persistent asthma without complication 06/07/2018   • Moderate major depression (CMS/HCC) 06/07/2018   • Preventative health care 06/07/2018   • Malaise and fatigue 04/04/2018   • Snoring 04/04/2018   • Depression 10/21/2010         Review of Systems   Constitutional: Positive for fatigue. Negative for fever.   Respiratory: Positive for shortness of breath and wheezing.      Objective   Vitals:    07/27/20 1140   BP: 98/68   BP Location: Right upper arm   Patient Position: Sitting   Pulse: 77   Temp: 36.5 °C (97.7 °F)   TempSrc: Temporal   SpO2: 99%   Weight: 62 kg (136 lb 9.6 oz)   Height: 1.575 m (5' 2\")     Body mass index is 24.98 kg/m².  Physical Exam   Constitutional: She appears well-developed and well-nourished.   Cardiovascular: Normal rate, regular rhythm, normal heart sounds and intact distal pulses.   No murmur heard.  Pulmonary/Chest: Effort normal and breath sounds normal. She has no wheezes. She has no rales.       Procedures    Lab Results   Component Value Date    WBC 4.5 07/17/2020    HGB 12.1 07/17/2020    HCT 36.6 07/17/2020     07/17/2020    CHOL 194 07/17/2020    TRIG 89 07/17/2020    HDL 97 07/17/2020    LDLCALC 79 07/17/2020    ALT 10 07/17/2020    AST 13 07/17/2020     07/17/2020    K 4.1 07/17/2020     07/17/2020    CREATININE 0.78 07/17/2020    BUN 14 07/17/2020    CO2 21 07/17/2020    TSH 1.440 07/17/2020    GLUCOSE 95 07/17/2020           Assessment   Problem List Items Addressed This Visit        Respiratory    Moderate persistent asthma without complication - Primary     Continue meds  Prednisone 50 mg daily for 5 days  Dr Escamilla  Covid antibodies         Relevant Orders    Covid-19 SARS CoV-2 Antibody (IgG)    Ambulatory referral to Pulmonology       Other    Moderate major depression (CMS/HCC)     Continue meds                   Harry A. Frankel, MD  7/27/2020 "

## 2020-07-27 NOTE — ASSESSMENT & PLAN NOTE
Not well controlled  Continue meds  Prednisone 50 mg daily for 5 days  Dr Escamilla  Covid antibodies

## 2020-09-04 ENCOUNTER — TRANSCRIBE ORDERS (OUTPATIENT)
Dept: SCHEDULING | Age: 35
End: 2020-09-04

## 2020-09-04 DIAGNOSIS — J45.909 UNSPECIFIED ASTHMA, UNCOMPLICATED: Primary | ICD-10-CM

## 2020-09-08 ENCOUNTER — HOSPITAL ENCOUNTER (OUTPATIENT)
Dept: RADIOLOGY | Age: 35
Discharge: HOME | End: 2020-09-08
Attending: INTERNAL MEDICINE
Payer: COMMERCIAL

## 2020-09-08 ENCOUNTER — HOSPITAL ENCOUNTER (OUTPATIENT)
Dept: PULMONOLOGY | Facility: HOSPITAL | Age: 35
Discharge: HOME | End: 2020-09-08
Attending: INTERNAL MEDICINE
Payer: COMMERCIAL

## 2020-09-08 DIAGNOSIS — J45.909 UNSPECIFIED ASTHMA, UNCOMPLICATED: ICD-10-CM

## 2020-09-08 PROCEDURE — 94060 EVALUATION OF WHEEZING: CPT

## 2020-09-08 PROCEDURE — 63700000 HC SELF-ADMINISTRABLE DRUG: Performed by: INTERNAL MEDICINE

## 2020-09-08 PROCEDURE — 71046 X-RAY EXAM CHEST 2 VIEWS: CPT

## 2020-09-08 PROCEDURE — 94726 PLETHYSMOGRAPHY LUNG VOLUMES: CPT

## 2020-09-08 PROCEDURE — 94729 DIFFUSING CAPACITY: CPT

## 2020-09-08 RX ORDER — ALBUTEROL SULFATE 90 UG/1
4 INHALANT RESPIRATORY (INHALATION) ONCE
Status: COMPLETED | OUTPATIENT
Start: 2020-09-08 | End: 2020-09-08

## 2020-09-08 RX ORDER — CITALOPRAM 20 MG/1
20 TABLET, FILM COATED ORAL DAILY
Qty: 90 TABLET | Refills: 1 | Status: SHIPPED | OUTPATIENT
Start: 2020-09-08 | End: 2021-03-10

## 2020-09-08 RX ADMIN — ALBUTEROL SULFATE 4 PUFF: 90 AEROSOL, METERED RESPIRATORY (INHALATION) at 13:43

## 2020-09-11 ENCOUNTER — HOSPITAL ENCOUNTER (OUTPATIENT)
Dept: PULMONOLOGY | Facility: HOSPITAL | Age: 35
Discharge: HOME | End: 2020-09-11
Attending: INTERNAL MEDICINE
Payer: COMMERCIAL

## 2020-09-11 DIAGNOSIS — J45.909 UNCOMPLICATED ASTHMA, UNSPECIFIED ASTHMA SEVERITY, UNSPECIFIED WHETHER PERSISTENT: ICD-10-CM

## 2020-09-11 DIAGNOSIS — J45.909 UNSPECIFIED ASTHMA, UNCOMPLICATED: ICD-10-CM

## 2020-09-11 PROCEDURE — 25000013 HC METHACHOLINE CHLORIDE THROUGH NEBULIZER, PER 1 MG

## 2020-09-11 PROCEDURE — 95070 INHLJ BRNCL CHALLENGE TSTG: CPT

## 2020-09-11 PROCEDURE — 25000000 HC PHARMACY GENERAL: Performed by: INTERNAL MEDICINE

## 2020-09-11 PROCEDURE — 94070 EVALUATION OF WHEEZING: CPT

## 2020-09-11 RX ORDER — ALBUTEROL SULFATE 0.83 MG/ML
2.5 SOLUTION RESPIRATORY (INHALATION) ONCE
Status: COMPLETED | OUTPATIENT
Start: 2020-09-11 | End: 2020-09-11

## 2020-09-11 RX ADMIN — ALBUTEROL SULFATE 2.5 MG: 2.5 SOLUTION RESPIRATORY (INHALATION) at 13:44

## 2020-09-15 LAB
SARS-COV-2 IGG SERPL QL IA: NEGATIVE
SARS-COV-2 IGG SERPL QL IA: NORMAL

## 2020-09-17 ENCOUNTER — TELEPHONE (OUTPATIENT)
Dept: FAMILY MEDICINE | Facility: CLINIC | Age: 35
End: 2020-09-17

## 2020-10-28 ENCOUNTER — OFFICE VISIT (OUTPATIENT)
Dept: FAMILY MEDICINE | Facility: CLINIC | Age: 35
End: 2020-10-28
Payer: COMMERCIAL

## 2020-10-28 VITALS
BODY MASS INDEX: 26.06 KG/M2 | DIASTOLIC BLOOD PRESSURE: 76 MMHG | SYSTOLIC BLOOD PRESSURE: 113 MMHG | HEART RATE: 75 BPM | WEIGHT: 141.6 LBS | TEMPERATURE: 99.4 F | HEIGHT: 62 IN | OXYGEN SATURATION: 98 %

## 2020-10-28 DIAGNOSIS — H91.93 BILATERAL HEARING LOSS, UNSPECIFIED HEARING LOSS TYPE: Primary | ICD-10-CM

## 2020-10-28 DIAGNOSIS — L24.9 IRRITANT CONTACT DERMATITIS, UNSPECIFIED TRIGGER: ICD-10-CM

## 2020-10-28 PROCEDURE — 69210 REMOVE IMPACTED EAR WAX UNI: CPT | Performed by: FAMILY MEDICINE

## 2020-10-28 PROCEDURE — 99213 OFFICE O/P EST LOW 20 MIN: CPT | Mod: 25 | Performed by: FAMILY MEDICINE

## 2020-10-28 RX ORDER — BETAMETHASONE VALERATE 1 MG/G
CREAM TOPICAL 2 TIMES DAILY
Qty: 45 G | Refills: 1 | Status: SHIPPED | OUTPATIENT
Start: 2020-10-28 | End: 2020-11-27

## 2020-10-28 ASSESSMENT — ENCOUNTER SYMPTOMS: FEVER: 0

## 2020-10-28 NOTE — PROCEDURES
Ear cerumen removal    Date/Time: 10/28/2020 3:29 PM  Performed by: Frankel, Harry A, MD  Authorized by: Frankel, Harry A, MD     Consent:     Consent obtained:  Verbal    Consent given by:  Patient    Risks discussed:  Dizziness    Alternatives discussed:  No treatment  Procedure details:     Location:  L ear and R ear    Procedure type: curette    Post-procedure details:     Inspection:  TM intact    Patient tolerance of procedure:  Tolerated well, no immediate complications

## 2020-10-28 NOTE — PROGRESS NOTES
[place] [address]  [phone]       Reason for visit:   Chief Complaint   Patient presents with   • Rash     on arms, ear cleaning      HPI   Analilia Gunderson is a 35 y.o. female who presents with clogged ears and skin rash. Rash upper arm. 2-3 days. Very itchy.      Past Medical History:   Diagnosis Date   • Asthma 09/2020     History reviewed. No pertinent surgical history.  Social History     Tobacco Use   • Smoking status: Former Smoker   • Smokeless tobacco: Never Used   Substance Use Topics   • Alcohol use: Yes     Comment: social   • Drug use: No      Family History   Problem Relation Age of Onset   • Thyroid cancer Biological Mother      Penicillins  Current Outpatient Medications   Medication Sig Dispense Refill   • albuterol 2.5 mg /3 mL (0.083 %) nebulizer solution Take 3 mL (2.5 mg total) by nebulization every 6 (six) hours as needed for wheezing. 1 Package 1   • albuterol sulfate 90 mcg/actuation inhaler Inhale 2 puffs every 6 (six) hours as needed for wheezing. 1 Package 3   • budesonide (PULMICORT) 0.5 mg/2 mL nebulizer solution Take 0.5 mg by nebulization once daily. Rinse mouth with water after use to reduce aftertaste and incidence of candidiasis. Do not swallow.     • cetirizine (ZyrTEC) 10 mg tablet 10 mg.     • citalopram (celeXA) 20 mg tablet Take 1 tablet (20 mg total) by mouth daily. 90 tablet 1   • cyanocobalamin (vitamin B-12) 1,000 mcg tablet Take by mouth.     • fluticasone (FLONASE) 50 mcg/actuation nasal spray Administer 1 spray into each nostril daily.     • formoterol (PERFOROMIST) 20 mcg/2 mL nebulizer solution Take 20 mcg by nebulization 2 (two) times a day.     • montelukast (SINGULAIR) 10 mg tablet TAKE 1 TABLET EVERY EVENING 90 tablet 3   • betamethasone valerate (VALISONE) 0.1 % cream Apply topically 2 (two) times a day. 45 g 1     No current facility-administered medications for this visit.   "      Patient Active Problem List    Diagnosis Date Noted   • Bilateral hearing loss 10/28/2020   • Irritant contact dermatitis 10/28/2020   • Severe asthma without complication    • Seasonal allergic rhinitis due to pollen 05/04/2020   • Moderate persistent asthma without complication 06/07/2018   • Moderate major depression (CMS/HCC) 06/07/2018   • Preventative health care 06/07/2018   • Malaise and fatigue 04/04/2018   • Snoring 04/04/2018   • Depression 10/21/2010         Review of Systems   Constitutional: Negative for fever.   HENT: Positive for hearing loss. Negative for ear discharge and ear pain.    Skin: Positive for rash.     Objective   Vitals:    10/28/20 1507   BP: 113/76   BP Location: Left upper arm   Patient Position: Sitting   Pulse: 75   Temp: 37.4 °C (99.4 °F)   TempSrc: Temporal   SpO2: 98%   Weight: 64.2 kg (141 lb 9.6 oz)   Height: 1.575 m (5' 2\")     Body mass index is 25.9 kg/m².  Physical Exam  HENT:      Right Ear: Tympanic membrane and ear canal normal. There is impacted cerumen.      Left Ear: Tympanic membrane and ear canal normal. There is impacted cerumen.   Skin:     Comments: Bilateral erythematous skin rash of axilla          Procedures    Lab Results   Component Value Date    WBC 4.5 07/17/2020    HGB 12.1 07/17/2020    HCT 36.6 07/17/2020     07/17/2020    CHOL 194 07/17/2020    TRIG 89 07/17/2020    HDL 97 07/17/2020    LDLCALC 79 07/17/2020    ALT 10 07/17/2020    AST 13 07/17/2020     07/17/2020    K 4.1 07/17/2020     07/17/2020    CREATININE 0.78 07/17/2020    BUN 14 07/17/2020    CO2 21 07/17/2020    TSH 1.440 07/17/2020    GLUCOSE 95 07/17/2020           Assessment   Problem List Items Addressed This Visit        Nervous    Bilateral hearing loss - Primary     Due to cerumen  Improved after procedure         Relevant Orders    Ear cerumen removal       Infectious/Inflammatory    Irritant contact dermatitis     Betamethasone cream         Relevant " Medications    betamethasone valerate (VALISONE) 0.1 % cream    Other Relevant Orders    Ear cerumen removal              Harry A. Frankel, MD  10/28/2020

## 2021-02-18 ENCOUNTER — TELEPHONE (OUTPATIENT)
Dept: FAMILY MEDICINE | Facility: CLINIC | Age: 36
End: 2021-02-18

## 2021-04-15 DIAGNOSIS — Z23 ENCOUNTER FOR IMMUNIZATION: ICD-10-CM

## 2021-06-07 RX ORDER — MONTELUKAST SODIUM 10 MG/1
TABLET ORAL
Qty: 90 TABLET | Refills: 3 | Status: SHIPPED | OUTPATIENT
Start: 2021-06-07

## 2021-06-08 RX ORDER — CITALOPRAM 20 MG/1
TABLET, FILM COATED ORAL
Qty: 90 TABLET | Refills: 3 | Status: SHIPPED | OUTPATIENT
Start: 2021-06-08